# Patient Record
Sex: MALE | Race: WHITE | NOT HISPANIC OR LATINO
[De-identification: names, ages, dates, MRNs, and addresses within clinical notes are randomized per-mention and may not be internally consistent; named-entity substitution may affect disease eponyms.]

---

## 2021-07-01 ENCOUNTER — APPOINTMENT (OUTPATIENT)
Dept: PEDIATRIC ORTHOPEDIC SURGERY | Facility: CLINIC | Age: 81
End: 2021-07-01
Payer: MEDICARE

## 2021-07-01 VITALS — HEIGHT: 66 IN | WEIGHT: 158 LBS | BODY MASS INDEX: 25.39 KG/M2

## 2021-07-01 DIAGNOSIS — Z82.69 FAMILY HISTORY OF OTHER DISEASES OF THE MUSCULOSKELETAL SYSTEM AND CONNECTIVE TISSUE: ICD-10-CM

## 2021-07-01 DIAGNOSIS — M10.071 IDIOPATHIC GOUT, RIGHT ANKLE AND FOOT: ICD-10-CM

## 2021-07-01 DIAGNOSIS — Z80.9 FAMILY HISTORY OF MALIGNANT NEOPLASM, UNSPECIFIED: ICD-10-CM

## 2021-07-01 PROBLEM — Z00.00 ENCOUNTER FOR PREVENTIVE HEALTH EXAMINATION: Status: ACTIVE | Noted: 2021-07-01

## 2021-07-01 PROCEDURE — 99213 OFFICE O/P EST LOW 20 MIN: CPT

## 2021-07-01 RX ORDER — PROBENECID AND COLCHICINE 500; .5 MG/1; MG/1
0.5-5 TABLET ORAL
Qty: 30 | Refills: 2 | Status: ACTIVE | COMMUNITY
Start: 2021-07-01 | End: 1900-01-01

## 2021-07-01 RX ORDER — NAPROXEN 375 MG/1
375 TABLET ORAL TWICE DAILY
Qty: 60 | Refills: 1 | Status: ACTIVE | COMMUNITY
Start: 2021-07-01 | End: 1900-01-01

## 2021-07-01 RX ORDER — ATORVASTATIN CALCIUM 80 MG/1
TABLET, FILM COATED ORAL
Refills: 0 | Status: ACTIVE | COMMUNITY

## 2021-07-01 NOTE — ASSESSMENT
[FreeTextEntry1] : Impression: Acute gout right ankle.\par \par This patient will be treated with restricted activities along with Naprosyn 375 mg twice daily PC as well as colchicine/probenecid twice daily.  The potential for injection if he is not improving.

## 2021-07-01 NOTE — HISTORY OF PRESENT ILLNESS
[de-identified] : This 80-year-old is seen today with a 5-day history of acute onset of pain to his right ankle associated with swelling and limp.  No obvious history of trauma precipitating event he does have a history of gout.  He in the past has been treated effectively with Naprosyn.

## 2021-07-01 NOTE — PHYSICAL EXAM
[de-identified] : On exam he has an antalgic gait with limp on the right side there is moderate restriction of motion to the ankle and subtalar joint.  There is swelling about the ankle with obvious effusion present tenderness diffusely no erythema or increased warmth neurovascular status is intact.

## 2021-11-09 ENCOUNTER — APPOINTMENT (OUTPATIENT)
Dept: PEDIATRIC ORTHOPEDIC SURGERY | Facility: CLINIC | Age: 81
End: 2021-11-09
Payer: MEDICARE

## 2021-11-09 VITALS — BODY MASS INDEX: 25.39 KG/M2 | WEIGHT: 158 LBS | HEIGHT: 66 IN

## 2021-11-09 DIAGNOSIS — M65.311 TRIGGER THUMB, RIGHT THUMB: ICD-10-CM

## 2021-11-09 DIAGNOSIS — M19.041 PRIMARY OSTEOARTHRITIS, RIGHT HAND: ICD-10-CM

## 2021-11-09 PROCEDURE — 73140 X-RAY EXAM OF FINGER(S): CPT

## 2021-11-09 PROCEDURE — 99212 OFFICE O/P EST SF 10 MIN: CPT

## 2021-11-09 NOTE — PHYSICAL EXAM
[de-identified] : Exam today reveals no tenderness about the carpometacarpal joint of the thumb he does have good motion of the DIP joint though there is early triggering present.  He is tender about the proximal flexor tendon sheath and flexor tendon itself in the region of the MP joint.  Stress of the MP joint is without discomfort there is a negative grind test.\par \par X-rays of the right thumb taken today reveal advanced degenerative joint disease to the distal IP joint as well as to the proximal joints in the thumb.

## 2021-11-09 NOTE — ASSESSMENT
[FreeTextEntry1] : Impression: Degenerative joint disease right thumb, tenosynovitis right thumb rule out triggering.\par \par This patient will continue with naproxen of also advised that he take the Colcrys as well as he has a strong history of gout.  If he is not improving a steroid injection would be advised which he is fearful of at this time

## 2021-11-09 NOTE — HISTORY OF PRESENT ILLNESS
[de-identified] : This 80-year-old left-handed patient is seen with a 1 month history of pain about the volar aspect of his right thumb in the region of the MP joint.  No obvious history of trauma he does not have obvious symptoms of triggering.  It is to be noted he does have a history of gout with multiple flareups in the past

## 2022-01-12 RX ORDER — PROBENECID AND COLCHICINE 500; .5 MG/1; MG/1
0.5-5 TABLET ORAL
Qty: 30 | Refills: 2 | Status: ACTIVE | COMMUNITY
Start: 2022-01-12 | End: 1900-01-01

## 2022-12-15 RX ORDER — NAPROXEN 375 MG/1
375 TABLET, DELAYED RELEASE ORAL TWICE DAILY
Qty: 60 | Refills: 1 | Status: ACTIVE | COMMUNITY
Start: 2022-12-15 | End: 1900-01-01

## 2024-03-15 ENCOUNTER — APPOINTMENT (OUTPATIENT)
Dept: PEDIATRIC ORTHOPEDIC SURGERY | Facility: CLINIC | Age: 84
End: 2024-03-15
Payer: MEDICARE

## 2024-03-15 VITALS — WEIGHT: 165.38 LBS | BODY MASS INDEX: 26.9 KG/M2 | TEMPERATURE: 96.8 F | HEIGHT: 65.6 IN

## 2024-03-15 PROCEDURE — 20610 DRAIN/INJ JOINT/BURSA W/O US: CPT | Mod: LT

## 2024-03-15 PROCEDURE — 99213 OFFICE O/P EST LOW 20 MIN: CPT | Mod: 25

## 2024-03-15 NOTE — ASSESSMENT
[FreeTextEntry1] : Impression: Impingement syndrome left shoulder.  The subacromial space has been injected uneventfully he will continue with his standard gout medicines along with Tylenol.  Return on a as needed basis

## 2024-03-15 NOTE — HISTORY OF PRESENT ILLNESS
[de-identified] : This 83-year-old seen with recent onset of pain and stiffness to his left shoulder with some difficulty raising above head.  No obvious injury no neck pain paresthesias no radiation distally.

## 2024-03-15 NOTE — PHYSICAL EXAM
SCr baseline: 1.88, currently stable    Monitor SCr  Monitor I&O   Renally dose meds [de-identified] : Exam reveals good motion to the cervical spine no spasm in the left shoulder has reasonable motion of the wrist with an obvious arc of impingement in full forward flexion and abduction.  He has discomfort in the subacromial space with a positive Neer and Hernandez strength is good.  No instability neurologically intact

## 2024-06-12 ENCOUNTER — APPOINTMENT (OUTPATIENT)
Dept: PEDIATRIC ORTHOPEDIC SURGERY | Facility: CLINIC | Age: 84
End: 2024-06-12
Payer: MEDICARE

## 2024-06-12 VITALS — TEMPERATURE: 97 F | WEIGHT: 165 LBS | HEIGHT: 65 IN | BODY MASS INDEX: 27.49 KG/M2

## 2024-06-12 DIAGNOSIS — M75.41 IMPINGEMENT SYNDROME OF RIGHT SHOULDER: ICD-10-CM

## 2024-06-12 DIAGNOSIS — M25.812 OTHER SPECIFIED JOINT DISORDERS, LEFT SHOULDER: ICD-10-CM

## 2024-06-12 PROCEDURE — 99212 OFFICE O/P EST SF 10 MIN: CPT

## 2024-06-12 NOTE — ASSESSMENT
[FreeTextEntry1] : Impression: Bilateral impingement syndrome.  He will be treated with formal physical therapy return on appearing basis

## 2024-06-12 NOTE — PHYSICAL EXAM
[de-identified] : Examination today reveals she has very good motion on both sides for his age there is only restricted motion with discomfort on adduction of both shoulders across the midline.  His strength is more than acceptable no instability on stress no crepitus on motion

## 2024-06-12 NOTE — HISTORY OF PRESENT ILLNESS
[de-identified] : This patient returns for reevaluation of both shoulders send medical symptoms on both sides.  He is functional however

## 2024-12-16 RX ORDER — NAPROXEN 375 MG/1
375 TABLET, DELAYED RELEASE ORAL TWICE DAILY
Qty: 60 | Refills: 1 | Status: ACTIVE | COMMUNITY
Start: 2024-12-16 | End: 1900-01-01

## 2025-06-19 RX ORDER — PROBENECID AND COLCHICINE 500; .5 MG/1; MG/1
0.5-5 TABLET ORAL
Qty: 30 | Refills: 2 | Status: ACTIVE | COMMUNITY
Start: 2025-06-19 | End: 1900-01-01